# Patient Record
Sex: MALE | NOT HISPANIC OR LATINO | Employment: FULL TIME | URBAN - METROPOLITAN AREA
[De-identification: names, ages, dates, MRNs, and addresses within clinical notes are randomized per-mention and may not be internally consistent; named-entity substitution may affect disease eponyms.]

---

## 2020-11-25 ENCOUNTER — OFFICE VISIT (OUTPATIENT)
Dept: CARDIOLOGY | Facility: MEDICAL CENTER | Age: 55
End: 2020-11-25
Payer: COMMERCIAL

## 2020-11-25 VITALS
RESPIRATION RATE: 16 BRPM | SYSTOLIC BLOOD PRESSURE: 116 MMHG | OXYGEN SATURATION: 98 % | HEIGHT: 64 IN | DIASTOLIC BLOOD PRESSURE: 82 MMHG | HEART RATE: 67 BPM | WEIGHT: 181.6 LBS | BODY MASS INDEX: 31 KG/M2

## 2020-11-25 DIAGNOSIS — R07.9 CHEST PAIN, UNSPECIFIED TYPE: ICD-10-CM

## 2020-11-25 DIAGNOSIS — Z01.810 PREOPERATIVE CARDIOVASCULAR EXAMINATION: ICD-10-CM

## 2020-11-25 LAB — EKG IMPRESSION: NORMAL

## 2020-11-25 PROCEDURE — 93000 ELECTROCARDIOGRAM COMPLETE: CPT | Performed by: INTERNAL MEDICINE

## 2020-11-25 PROCEDURE — 99204 OFFICE O/P NEW MOD 45 MIN: CPT | Performed by: INTERNAL MEDICINE

## 2020-11-25 ASSESSMENT — ENCOUNTER SYMPTOMS
CHILLS: 0
PND: 0
BLURRED VISION: 0
FEVER: 0
ABDOMINAL PAIN: 0
LOSS OF CONSCIOUSNESS: 0
ORTHOPNEA: 0
INSOMNIA: 0
SHORTNESS OF BREATH: 0
PALPITATIONS: 0
MYALGIAS: 0
DIZZINESS: 0

## 2020-11-25 NOTE — PROGRESS NOTES
Chief Complaint   Patient presents with   • Other     surgery clearance       Subjective:   Rene Musa is a 55 y.o. male who presents today furred by his PCP Heydi BENNETT for a preoperative cardiology consultation prior to undergoing foot surgery by Mario Ford D.P.M.    The patient required right foot surgery.  He was seen for preoperative evaluation and had an EKG that was felt to be abnormal.  He has no prior history of heart disease.  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  He goes up and down stairs frequently.  He has no chest pain or shortness of breath symptoms.  No history of palpitations or syncope.  Denies a history of hypertension, diabetes mellitus, dyslipidemia, family history of heart disease, heart murmur or rheumatic fever.    Previously smoked cigarettes 5-7 a day but quit 1 year ago.    History reviewed. No pertinent past medical history.  Past Surgical History:   Procedure Laterality Date   • PTERYGIUM EXCISION Left 3/23/2016    Procedure: PTERYGIUM EXCISION CONJUNCTIVAL AUTO PATCH GRAFT;  Surgeon: Fredy La M.D.;  Location: SURGERY SAME DAY [facility];  Service:    • PTERYGIUM EXCISION  3/26/2014    Performed by Fredy La M.D. at SURGERY SAME DAY [facility]S     History reviewed. No pertinent family history.  Social History     Socioeconomic History   • Marital status: Single     Spouse name: Not on file   • Number of children: Not on file   • Years of education: Not on file   • Highest education level: Not on file   Occupational History   • Not on file   Social Needs   • Financial resource strain: Not on file   • Food insecurity     Worry: Not on file     Inability: Not on file   • Transportation needs     Medical: Not on file     Non-medical: Not on file   Tobacco Use   • Smoking status: Former Smoker     Packs/day: 0.50     Types: Cigarettes     Quit date: 2019     Years since quittin.0   • Smokeless tobacco: Never Used   Substance and  "Sexual Activity   • Alcohol use: Yes     Comment: \"6 beers per week\"   • Drug use: No   • Sexual activity: Not on file   Lifestyle   • Physical activity     Days per week: Not on file     Minutes per session: Not on file   • Stress: Not on file   Relationships   • Social connections     Talks on phone: Not on file     Gets together: Not on file     Attends [employer] service: Not on file     Active member of club or organization: Not on file     Attends meetings of clubs or organizations: Not on file     Relationship status: Not on file   • Intimate partner violence     Fear of current or ex partner: Not on file     Emotionally abused: Not on file     Physically abused: Not on file     Forced sexual activity: Not on file   Other Topics Concern   • Not on file   Social History Narrative   • Not on file     No Known Allergies  Outpatient Encounter Medications as of 11/25/2020   Medication Sig Dispense Refill   • ibuprofen (MOTRIN) 200 MG Tab Take 200 mg by mouth every 8 hours as needed.     • Multiple Vitamins-Minerals (CENTRUM) TABS Take 1 Tab by mouth every day.       No facility-administered encounter medications on file as of 11/25/2020.      Review of Systems   Constitutional: Negative for chills and fever.   HENT: Negative for congestion.    Eyes: Negative for blurred vision.   Respiratory: Negative for shortness of breath.    Cardiovascular: Negative for chest pain, palpitations, orthopnea, leg swelling and PND.   Gastrointestinal: Negative for abdominal pain.   Genitourinary: Negative for dysuria.   Musculoskeletal: Negative for joint pain and myalgias.   Skin: Negative for rash.   Neurological: Negative for dizziness and loss of consciousness.   Psychiatric/Behavioral: The patient does not have insomnia.         Objective:   /82 (BP Location: Left arm, Patient Position: Sitting, BP Cuff Size: Adult)   Pulse 67   Resp 16   Ht 1.626 m (5' 4\")   Wt 82.4 kg (181 lb 9.6 oz)   SpO2 98%   BMI 31.17 kg/m² "     Physical Exam   Constitutional: He is oriented to person, place, and time. He appears well-developed and well-nourished. No distress.   Eyes: Pupils are equal, round, and reactive to light. Conjunctivae and EOM are normal.   Neck: Normal range of motion. Neck supple. No JVD present.   Cardiovascular: Normal rate, regular rhythm, normal heart sounds and intact distal pulses. Exam reveals no gallop and no friction rub.   No murmur heard.  Pulses:       Carotid pulses are 2+ on the right side and 2+ on the left side.  Pulmonary/Chest: Effort normal and breath sounds normal. No accessory muscle usage. No respiratory distress. He has no wheezes. He has no rales.   Abdominal: Soft. He exhibits no distension and no mass. There is no hepatosplenomegaly. There is no abdominal tenderness.   Musculoskeletal:         General: No edema.   Neurological: He is alert and oriented to person, place, and time.   Skin: Skin is warm, dry and intact. No rash noted. No cyanosis. Nails show no clubbing.   Psychiatric: He has a normal mood and affect. His behavior is normal.   Vitals reviewed.    EKG 11/25/2020 normal sinus rhythm, rate 60.  Early transition.  Early repolarization.  Personally viewed and interpreted.    Assessment:     1. Preoperative cardiovascular examination     2. Chest pain, unspecified type  EKG       Medical Decision Making:  Today's Assessment / Status / Plan:     Assessment/discussion  1.  Normal clinical cardiovascular examination.  2.  Reviewed EKG.  3.  Do not feel the patient needs further cardiac evaluation at this time.  4.  Cleared to undergo right foot surgery at a low periprocedure risk for cardiac events.  5.  Follow-up with PCP.  6.  RTC as needed

## 2020-11-25 NOTE — LETTER
[facility]   [address], [address]  [place] [address]  Phone: [phone]  Fax: [phone]              Rene Musa  1965    Encounter Date: 11/25/2020    Hilario Hamilton M.D.          PROGRESS NOTE:  Chief Complaint   Patient presents with   • Other     surgery clearance       Subjective:   Rene Musa is a 55 y.o. male who presents today furred by his PCP Heydi BENNETT for a preoperative cardiology consultation prior to undergoing foot surgery by Mario Ford D.P.M.    The patient required right foot surgery.  He was seen for preoperative evaluation and had an EKG that was felt to be abnormal.  He has no prior history of heart disease.  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  He has no chest pain or shortness of breath symptoms.  No history of palpitations or syncope.  Denies a history of hypertension, diabetes mellitus, dyslipidemia, family history of heart disease, heart murmur or rheumatic fever.    Previously smoked cigarettes 5-7 a day but quit 1 year ago.    History reviewed. No pertinent past medical history.  Past Surgical History:   Procedure Laterality Date   • PTERYGIUM EXCISION Left 3/23/2016    Procedure: PTERYGIUM EXCISION CONJUNCTIVAL AUTO PATCH GRAFT;  Surgeon: Fredy La M.D.;  Location: SURGERY SAME DAY [facility];  Service:    • PTERYGIUM EXCISION  3/26/2014    Performed by Fredy La M.D. at SURGERY SAME DAY [facility] ORS     History reviewed. No pertinent family history.  Social History     Socioeconomic History   • Marital status: Single     Spouse name: Not on file   • Number of children: Not on file   • Years of education: Not on file   • Highest education level: Not on file   Occupational History   • Not on file   Social Needs   • Financial resource strain: Not on file   • Food insecurity     Worry: Not on file     Inability: Not on file   •  "Transportation needs     Medical: Not on file     Non-medical: Not on file   Tobacco Use   • Smoking status: Former Smoker     Packs/day: 0.50     Types: Cigarettes     Quit date: 2019     Years since quittin.0   • Smokeless tobacco: Never Used   Substance and Sexual Activity   • Alcohol use: Yes     Comment: \"6 beers per week\"   • Drug use: No   • Sexual activity: Not on file   Lifestyle   • Physical activity     Days per week: Not on file     Minutes per session: Not on file   • Stress: Not on file   Relationships   • Social connections     Talks on phone: Not on file     Gets together: Not on file     Attends [employer] service: Not on file     Active member of club or organization: Not on file     Attends meetings of clubs or organizations: Not on file     Relationship status: Not on file   • Intimate partner violence     Fear of current or ex partner: Not on file     Emotionally abused: Not on file     Physically abused: Not on file     Forced sexual activity: Not on file   Other Topics Concern   • Not on file   Social History Narrative   • Not on file     No Known Allergies  Outpatient Encounter Medications as of 2020   Medication Sig Dispense Refill   • ibuprofen (MOTRIN) 200 MG Tab Take 200 mg by mouth every 8 hours as needed.     • Multiple Vitamins-Minerals (CENTRUM) TABS Take 1 Tab by mouth every day.       No facility-administered encounter medications on file as of 2020.      Review of Systems   Constitutional: Negative for chills and fever.   HENT: Negative for congestion.    Eyes: Negative for blurred vision.   Respiratory: Negative for shortness of breath.    Cardiovascular: Negative for chest pain, palpitations, orthopnea, leg swelling and PND.   Gastrointestinal: Negative for abdominal pain.   Genitourinary: Negative for dysuria.   Musculoskeletal: Negative for joint pain and myalgias.   Skin: Negative for rash.   Neurological: Negative for dizziness and loss of consciousness.  " "  Psychiatric/Behavioral: The patient does not have insomnia.         Objective:   /82 (BP Location: Left arm, Patient Position: Sitting, BP Cuff Size: Adult)   Pulse 67   Resp 16   Ht 1.626 m (5' 4\")   Wt 82.4 kg (181 lb 9.6 oz)   SpO2 98%   BMI 31.17 kg/m²     Physical Exam   Constitutional: He is oriented to person, place, and time. He appears well-developed and well-nourished. No distress.   Eyes: Pupils are equal, round, and reactive to light. Conjunctivae and EOM are normal.   Neck: Normal range of motion. Neck supple. No JVD present.   Cardiovascular: Normal rate, regular rhythm, normal heart sounds and intact distal pulses. Exam reveals no gallop and no friction rub.   No murmur heard.  Pulses:       Carotid pulses are 2+ on the right side and 2+ on the left side.  Pulmonary/Chest: Effort normal and breath sounds normal. No accessory muscle usage. No respiratory distress. He has no wheezes. He has no rales.   Abdominal: Soft. He exhibits no distension and no mass. There is no hepatosplenomegaly. There is no abdominal tenderness.   Musculoskeletal:         General: No edema.   Neurological: He is alert and oriented to person, place, and time.   Skin: Skin is warm, dry and intact. No rash noted. No cyanosis. Nails show no clubbing.   Psychiatric: He has a normal mood and affect. His behavior is normal.   Vitals reviewed.    EKG 11/25/2020 normal sinus rhythm, rate 60.  Early transition.  Early repolarization.  Personally viewed and interpreted.    Assessment:     1. Preoperative cardiovascular examination     2. Chest pain, unspecified type  EKG       Medical Decision Making:  Today's Assessment / Status / Plan:     Assessment/discussion  1.  Normal clinical cardiovascular examination.  2.  Reviewed EKG.  3.  Do not feel the patient needs further cardiac evaluation at this time.  4.  Cleared to undergo right foot surgery at a low periprocedure risk for cardiac events.  5.  Follow-up with PCP.  6.  " RTC as needed        Echo MEETA Gomez  [address] [address]  Via Mail     Mario Ford D.P.M.  [address] [facility] [address] [facility] [address]  Via Fax: [phone]